# Patient Record
Sex: FEMALE | Race: WHITE | Employment: FULL TIME | URBAN - METROPOLITAN AREA
[De-identification: names, ages, dates, MRNs, and addresses within clinical notes are randomized per-mention and may not be internally consistent; named-entity substitution may affect disease eponyms.]

---

## 2019-06-14 ENCOUNTER — HOSPITAL ENCOUNTER (OUTPATIENT)
Dept: ULTRASOUND IMAGING | Age: 19
Discharge: HOME | End: 2019-06-14
Attending: NURSE PRACTITIONER

## 2019-06-14 DIAGNOSIS — R10.11 RUQ PAIN: ICD-10-CM

## 2019-06-20 NOTE — PROGRESS NOTES
Left two messages on patient's phone but she states she did not receive them. Normal ultrasound. Still having symptoms. Will refer to GI.   Brigid Colunga

## 2019-07-24 ENCOUNTER — HOSPITAL ENCOUNTER (OUTPATIENT)
Dept: NUCLEAR MEDICINE | Age: 19
Discharge: HOME | End: 2019-07-24
Attending: NURSE PRACTITIONER
Payer: COMMERCIAL

## 2019-07-24 DIAGNOSIS — R11.2 NAUSEA WITH VOMITING: ICD-10-CM

## 2019-07-24 DIAGNOSIS — R10.11 RIGHT UPPER QUADRANT PAIN: ICD-10-CM

## 2019-07-24 DIAGNOSIS — R10.13 EPIGASTRIC PAIN: ICD-10-CM

## 2019-07-24 PROCEDURE — 78227 HEPATOBIL SYST IMAGE W/DRUG: CPT

## 2019-07-24 PROCEDURE — 74011250636 HC RX REV CODE- 250/636: Performed by: NURSE PRACTITIONER

## 2019-07-24 RX ADMIN — SINCALIDE 1.24 MCG: 5 INJECTION, POWDER, LYOPHILIZED, FOR SOLUTION INTRAVENOUS at 10:40

## 2019-08-21 ENCOUNTER — HOSPITAL ENCOUNTER (OUTPATIENT)
Dept: LAB | Age: 19
Discharge: HOME | End: 2019-08-21

## 2019-08-21 PROCEDURE — 88312 SPECIAL STAINS GROUP 1: CPT

## 2019-08-21 PROCEDURE — 88305 TISSUE EXAM BY PATHOLOGIST: CPT

## 2022-07-07 ENCOUNTER — OFFICE VISIT (OUTPATIENT)
Dept: OCCUPATIONAL MEDICINE | Age: 22
End: 2022-07-07

## 2022-07-07 DIAGNOSIS — R11.0 NAUSEA: Primary | ICD-10-CM

## 2022-07-07 PROCEDURE — 99213 OFFICE O/P EST LOW 20 MIN: CPT | Performed by: NURSE PRACTITIONER

## 2022-07-07 RX ORDER — FLUOXETINE HYDROCHLORIDE 40 MG/1
CAPSULE ORAL
COMMUNITY
Start: 2022-05-07 | End: 2022-07-07 | Stop reason: ALTCHOICE

## 2022-07-07 RX ORDER — LEVONORGESTREL AND ETHINYL ESTRADIOL 0.1-0.02MG
1 KIT ORAL DAILY
COMMUNITY
Start: 2022-05-10

## 2022-07-07 RX ORDER — MEDROXYPROGESTERONE ACETATE 150 MG/ML
INJECTION, SUSPENSION INTRAMUSCULAR
COMMUNITY

## 2022-07-07 RX ORDER — ONDANSETRON 4 MG/1
4 TABLET, FILM COATED ORAL DAILY PRN
Qty: 30 TABLET | Refills: 0 | Status: SHIPPED | OUTPATIENT
Start: 2022-07-07 | End: 2022-08-17 | Stop reason: SDUPTHER

## 2022-07-12 ASSESSMENT — ENCOUNTER SYMPTOMS
SHORTNESS OF BREATH: 0
ABDOMINAL PAIN: 0

## 2022-07-12 NOTE — PROGRESS NOTES
PROGRESS NOTE    SUBJECTIVE:   Colleen Germain is a 24 y.o. female seen for a follow up visit regarding Nausea   Pt reports possibly food poisoning. Reports that she is feeling better today but still having a lot of nausea. And would like some PRN meds. Reports no other issues. Stable. No fever. No N/V/D. No cp, no sob. Past Medical History, Past Surgical History, Family history, Social History, and Medications were all reviewed with the patient today and updated as necessary. Current Outpatient Medications   Medication Sig Dispense Refill    levonorgestrel-ethinyl estradiol (AVIANE;ALESSE;LESSINA) 0.1-20 MG-MCG per tablet Take 1 tablet by mouth daily      ondansetron (ZOFRAN) 4 MG tablet Take 1 tablet by mouth daily as needed for Nausea or Vomiting 30 tablet 0    medroxyPROGESTERone (DEPO-PROVERA) 150 MG/ML injection Inject into the muscle       No current facility-administered medications for this visit. Not on File  There is no problem list on file for this patient. No past medical history on file. No past surgical history on file. Family History   Problem Relation Age of Onset    Heart Attack Paternal Grandfather 64    Liver Disease Maternal Grandmother         great grandmother liver cancer     Social History     Tobacco Use    Smoking status: Never Smoker    Smokeless tobacco: Never Used   Substance Use Topics    Alcohol use: No       Review of Systems   Eyes: Negative for visual disturbance. Respiratory: Negative for shortness of breath. Cardiovascular: Negative for chest pain, palpitations and leg swelling. Gastrointestinal: Negative for abdominal pain. Neurological: Negative for dizziness, syncope and light-headedness. OBJECTIVE:  There were no vitals taken for this visit. Physical Exam  Constitutional:       Appearance: Normal appearance. Cardiovascular:      Rate and Rhythm: Normal rate and regular rhythm.    Pulmonary:      Effort: Pulmonary effort is normal.      Breath sounds: Normal breath sounds. Neurological:      Mental Status: She is alert. ASSESSMENT and PLAN    Frank Pizarro was seen today for nausea. Diagnoses and all orders for this visit:    Nausea    Other orders  -     ondansetron (ZOFRAN) 4 MG tablet; Take 1 tablet by mouth daily as needed for Nausea or Vomiting        BRAT diet, sips of fluids, take zofran as needed. no improvement or worsening RTC>

## 2022-08-17 ENCOUNTER — OFFICE VISIT (OUTPATIENT)
Dept: OCCUPATIONAL MEDICINE | Age: 22
End: 2022-08-17

## 2022-08-17 DIAGNOSIS — F41.9 ANXIETY: Primary | ICD-10-CM

## 2022-08-17 PROCEDURE — 99213 OFFICE O/P EST LOW 20 MIN: CPT | Performed by: NURSE PRACTITIONER

## 2022-08-17 RX ORDER — ONDANSETRON 4 MG/1
4 TABLET, FILM COATED ORAL DAILY PRN
Qty: 30 TABLET | Refills: 1 | Status: SHIPPED | OUTPATIENT
Start: 2022-08-17

## 2022-08-17 RX ORDER — HYDROXYZINE PAMOATE 25 MG/1
25 CAPSULE ORAL 3 TIMES DAILY PRN
Qty: 30 CAPSULE | Refills: 0 | Status: SHIPPED | OUTPATIENT
Start: 2022-08-17 | End: 2022-08-31

## 2022-08-21 RX ORDER — FLUOXETINE HYDROCHLORIDE 40 MG/1
CAPSULE ORAL
COMMUNITY
Start: 2022-08-11

## 2022-08-21 ASSESSMENT — ENCOUNTER SYMPTOMS
SHORTNESS OF BREATH: 0
ABDOMINAL PAIN: 0

## 2022-08-21 NOTE — PROGRESS NOTES
PROGRESS NOTE    SUBJECTIVE:   Arben Worthy is a 24 y.o. female seen for a follow up visit regarding Anxiety   The patient is a 24 y.o. female who presents for follow up of anxiety. Current anxiety symptoms: palpitations, racing thoughts. Symptoms are gradually worsening since last visit. She denies current suicidal and homicidal ideation. She is  tolerating current treatment well and notes that it is  effective. Reports that she is having some breakthrough anxiety due to changes at home. Reporrts no SI/Hi. Reports that she did start taking her prozac again this week. Past Medical History, Past Surgical History, Family history, Social History, and Medications were all reviewed with the patient today and updated as necessary. Current Outpatient Medications   Medication Sig Dispense Refill    ondansetron (ZOFRAN) 4 MG tablet Take 1 tablet by mouth daily as needed for Nausea or Vomiting 30 tablet 1    hydrOXYzine pamoate (VISTARIL) 25 MG capsule Take 1 capsule by mouth 3 times daily as needed for Anxiety 30 capsule 0    FLUoxetine (PROZAC) 40 MG capsule TAKE ONE CAPSULE BY MOUTH ONE TIME DAILY      medroxyPROGESTERone (DEPO-PROVERA) 150 MG/ML injection Inject into the muscle      levonorgestrel-ethinyl estradiol (AVIANE;ALESSE;LESSINA) 0.1-20 MG-MCG per tablet Take 1 tablet by mouth daily       No current facility-administered medications for this visit. Not on File  There is no problem list on file for this patient. No past medical history on file. No past surgical history on file. Family History   Problem Relation Age of Onset    Heart Attack Paternal Grandfather 64    Liver Disease Maternal Grandmother         great grandmother liver cancer     Social History     Tobacco Use    Smoking status: Never    Smokeless tobacco: Never   Substance Use Topics    Alcohol use: No       Review of Systems   Eyes:  Negative for visual disturbance. Respiratory:  Negative for shortness of breath. Cardiovascular:  Negative for chest pain, palpitations and leg swelling. Gastrointestinal:  Negative for abdominal pain. Neurological:  Negative for dizziness, syncope and light-headedness. OBJECTIVE:  There were no vitals taken for this visit. Physical Exam  Cardiovascular:      Rate and Rhythm: Normal rate and regular rhythm. Pulmonary:      Effort: Pulmonary effort is normal.      Breath sounds: Normal breath sounds. Neurological:      Mental Status: She is alert. ASSESSMENT and PLAN    Zuhair Gilbert was seen today for anxiety. Diagnoses and all orders for this visit:    Anxiety    Other orders  -     ondansetron (ZOFRAN) 4 MG tablet; Take 1 tablet by mouth daily as needed for Nausea or Vomiting  -     hydrOXYzine pamoate (VISTARIL) 25 MG capsule; Take 1 capsule by mouth 3 times daily as needed for Anxiety      Will add on PRN vistaril. Continue with prozac. Reviewed nonpharm interventions. Reviewed triggers, prevention. No improvement or worsenign RTC. Follow up in 1 month.

## 2022-09-16 ENCOUNTER — TELEPHONE (OUTPATIENT)
Dept: OCCUPATIONAL MEDICINE | Age: 22
End: 2022-09-16

## 2022-09-16 RX ORDER — HYDROXYZINE PAMOATE 25 MG/1
25 CAPSULE ORAL 3 TIMES DAILY PRN
Qty: 30 CAPSULE | Refills: 1 | Status: SHIPPED | OUTPATIENT
Start: 2022-09-16 | End: 2022-10-06

## 2022-09-16 NOTE — TELEPHONE ENCOUNTER
Pt is doing well with PRN vistrail. Works well with no SE. Working on exercise and talking to someone. Will follow up in office thismonth.

## 2022-09-27 ENCOUNTER — OFFICE VISIT (OUTPATIENT)
Dept: OCCUPATIONAL MEDICINE | Age: 22
End: 2022-09-27

## 2022-09-27 DIAGNOSIS — G44.209 TENSION HEADACHE: Primary | ICD-10-CM

## 2022-09-27 RX ORDER — CYCLOBENZAPRINE HCL 5 MG
5 TABLET ORAL NIGHTLY PRN
Qty: 30 TABLET | Refills: 0 | Status: SHIPPED | OUTPATIENT
Start: 2022-09-27 | End: 2022-10-07

## 2022-09-27 RX ORDER — IBUPROFEN 800 MG/1
800 TABLET ORAL 2 TIMES DAILY PRN
Qty: 60 TABLET | Refills: 0 | Status: SHIPPED | OUTPATIENT
Start: 2022-09-27

## 2022-10-02 ASSESSMENT — ENCOUNTER SYMPTOMS
SHORTNESS OF BREATH: 0
ABDOMINAL PAIN: 0

## 2022-10-02 NOTE — PROGRESS NOTES
PROGRESS NOTE    SUBJECTIVE:   Fanny Wilkins is a 20 y.o. female seen for a follow up visit regarding Headache   Headache: Patient complains of headache. She does not have a headache at this time. Description of Headaches:  Location of pain: temporal  Radiation of pain?:bilateral  Character of pain:throbbing  Severity of pain: 5  Accompanying symptoms: nausea, vomiting  Prodromal sx?: nausea, vomiting  Rapidity of onset: sudden  Typical duration of individual headache: 4 hours  Are most headaches similar instress presentation? yes  Typical precipitants:     Temporal Pattern of Headaches:  Started having HAs several months ago  Worst time of day: morning, mid-day, afternoon, evening, nighttime  Awaken from sleep?: no  Seasonal pattern?: no  Clustering of HAs over time? no  Overall pattern since problem began: unchanged    Degree of Functional Impairment: moderate    Current Use of Meds to Treat HA:  Abortive meds? acetaminophen, aspirin/acetaminophen/caffeine  Daily use? yes   Prophylactic meds? NSAIDs     Additional Relevant History:  History of head/neck trauma? no  History of head/neck surgery? no  Family h/o headache problems? no  Use of meds that might worsen HAs? no  Exposure to carbon monoxide? no  Substance use:  no      Past Medical History, Past Surgical History, Family history, Social History, and Medications were all reviewed with the patient today and updated as necessary.        Current Outpatient Medications   Medication Sig Dispense Refill    cyclobenzaprine (FLEXERIL) 5 MG tablet Take 1 tablet by mouth nightly as needed for Muscle spasms 30 tablet 0    ibuprofen (ADVIL;MOTRIN) 800 MG tablet Take 1 tablet by mouth 2 times daily as needed for Pain 60 tablet 0    hydrOXYzine pamoate (VISTARIL) 25 MG capsule Take 1 capsule by mouth 3 times daily as needed for Anxiety 30 capsule 1    FLUoxetine (PROZAC) 40 MG capsule TAKE ONE CAPSULE BY MOUTH ONE TIME DAILY      ondansetron (ZOFRAN) 4 MG tablet Take 1 tablet by mouth daily as needed for Nausea or Vomiting 30 tablet 1    medroxyPROGESTERone (DEPO-PROVERA) 150 MG/ML injection Inject into the muscle      levonorgestrel-ethinyl estradiol (AVIANE;ALESSE;LESSINA) 0.1-20 MG-MCG per tablet Take 1 tablet by mouth daily       No current facility-administered medications for this visit. Not on File  There is no problem list on file for this patient. No past medical history on file. No past surgical history on file. Family History   Problem Relation Age of Onset    Heart Attack Paternal Grandfather 64    Liver Disease Maternal Grandmother         great grandmother liver cancer     Social History     Tobacco Use    Smoking status: Never    Smokeless tobacco: Never   Substance Use Topics    Alcohol use: No       Review of Systems   Eyes:  Negative for visual disturbance. Respiratory:  Negative for shortness of breath. Cardiovascular:  Negative for chest pain, palpitations and leg swelling. Gastrointestinal:  Negative for abdominal pain. Neurological:  Negative for dizziness, syncope and light-headedness. OBJECTIVE:  There were no vitals taken for this visit. Physical Exam  Constitutional:       Appearance: Normal appearance. Cardiovascular:      Rate and Rhythm: Normal rate and regular rhythm. Pulmonary:      Effort: Pulmonary effort is normal.      Breath sounds: Normal breath sounds. Neurological:      Mental Status: She is alert. ASSESSMENT and PLAN    Walt Woodall was seen today for headache. Diagnoses and all orders for this visit:    Tension headache    Other orders  -     cyclobenzaprine (FLEXERIL) 5 MG tablet; Take 1 tablet by mouth nightly as needed for Muscle spasms  -     ibuprofen (ADVIL;MOTRIN) 800 MG tablet; Take 1 tablet by mouth 2 times daily as needed for Pain      Increase hydration. Reviewed triggers, prevention and hygiene. Take meds as needed during headache. Keep headache diary.

## 2022-10-30 RX ORDER — HYDROXYZINE PAMOATE 25 MG/1
CAPSULE ORAL
Qty: 30 CAPSULE | Refills: 0 | OUTPATIENT
Start: 2022-10-30

## 2022-11-29 ENCOUNTER — OFFICE VISIT (OUTPATIENT)
Dept: OCCUPATIONAL MEDICINE | Age: 22
End: 2022-11-29

## 2022-11-29 DIAGNOSIS — F41.9 ANXIETY: Primary | ICD-10-CM

## 2022-11-29 RX ORDER — IBUPROFEN 800 MG/1
800 TABLET ORAL 2 TIMES DAILY PRN
Qty: 60 TABLET | Refills: 0 | Status: SHIPPED | OUTPATIENT
Start: 2022-11-29

## 2022-11-29 RX ORDER — HYDROXYZINE PAMOATE 25 MG/1
CAPSULE ORAL
Qty: 30 CAPSULE | Refills: 3 | Status: SHIPPED | OUTPATIENT
Start: 2022-11-29

## 2022-11-29 RX ORDER — HYDROXYZINE PAMOATE 25 MG/1
CAPSULE ORAL
COMMUNITY
Start: 2022-10-24 | End: 2022-11-29 | Stop reason: SDUPTHER

## 2022-11-30 LAB
CHOLEST SERPL-MCNC: 202 MG/DL (ref 100–199)
GLUCOSE SERPL-MCNC: 124 MG/DL (ref 70–99)
HBA1C MFR BLD: 5.3 % (ref 4.8–5.6)
HDLC SERPL-MCNC: 52 MG/DL
LDLC SERPL CALC-MCNC: 130 MG/DL (ref 0–99)
LDLC/HDLC SERPL: 2.5 RATIO (ref 0–3.2)
TRIGL SERPL-MCNC: 113 MG/DL (ref 0–149)
VLDLC SERPL CALC-MCNC: 20 MG/DL (ref 5–40)

## 2022-11-30 ASSESSMENT — ENCOUNTER SYMPTOMS
SHORTNESS OF BREATH: 0
ABDOMINAL PAIN: 0

## 2022-12-01 NOTE — PROGRESS NOTES
PROGRESS NOTE    SUBJECTIVE:   Manav Linda is a 20 y.o. female seen for a follow up visit regarding Follow-up     The patient is a 20 y.o. female who presents for follow up of anxiety. Current anxiety symptoms: none. Symptoms are controlled since last visit. She denies current suicidal and homicidal ideation. She is  tolerating current treatment well and notes that it is  effective. Past Medical History, Past Surgical History, Family history, Social History, and Medications were all reviewed with the patient today and updated as necessary. Current Outpatient Medications   Medication Sig Dispense Refill    ibuprofen (ADVIL;MOTRIN) 800 MG tablet Take 1 tablet by mouth 2 times daily as needed for Pain 60 tablet 0    hydrOXYzine pamoate (VISTARIL) 25 MG capsule TAKE ONE CAPSULE BY MOUTH THREE TIMES A DAY AS NEEDED FOR ANXIETY 30 capsule 3    FLUoxetine (PROZAC) 40 MG capsule TAKE ONE CAPSULE BY MOUTH ONE TIME DAILY      ondansetron (ZOFRAN) 4 MG tablet Take 1 tablet by mouth daily as needed for Nausea or Vomiting 30 tablet 1    medroxyPROGESTERone (DEPO-PROVERA) 150 MG/ML injection Inject into the muscle      levonorgestrel-ethinyl estradiol (AVIANE;ALESSE;LESSINA) 0.1-20 MG-MCG per tablet Take 1 tablet by mouth daily       No current facility-administered medications for this visit. Not on File  There is no problem list on file for this patient. No past medical history on file. No past surgical history on file. Family History   Problem Relation Age of Onset    Heart Attack Paternal Grandfather 64    Liver Disease Maternal Grandmother         great grandmother liver cancer     Social History     Tobacco Use    Smoking status: Never    Smokeless tobacco: Never   Substance Use Topics    Alcohol use: No       Review of Systems   Eyes:  Negative for visual disturbance. Respiratory:  Negative for shortness of breath.     Cardiovascular:  Negative for chest pain, palpitations and leg swelling. Gastrointestinal:  Negative for abdominal pain. Neurological:  Negative for dizziness, syncope and light-headedness. OBJECTIVE:  There were no vitals taken for this visit. Physical Exam  Constitutional:       Appearance: Normal appearance. Cardiovascular:      Rate and Rhythm: Normal rate and regular rhythm. Pulmonary:      Effort: Pulmonary effort is normal.      Breath sounds: Normal breath sounds. Neurological:      Mental Status: She is alert. ASSESSMENT and PLAN    Shante Mack was seen today for follow-up. Diagnoses and all orders for this visit:    Anxiety    Other orders  -     ibuprofen (ADVIL;MOTRIN) 800 MG tablet; Take 1 tablet by mouth 2 times daily as needed for Pain  -     hydrOXYzine pamoate (VISTARIL) 25 MG capsule; TAKE ONE CAPSULE BY MOUTH THREE TIMES A DAY AS NEEDED FOR ANXIETY  -     Lipid Panel with LDL/HDL Ratio  -     Hemoglobin A1C  -     Glucose, Random      Current treatment plan is effective, no change in therapy. Reviewed diet, exercise and weight control. RTC as needed. Reviewed nonpharm interventions.

## 2023-01-23 ENCOUNTER — OFFICE VISIT (OUTPATIENT)
Dept: FAMILY MEDICINE CLINIC | Facility: CLINIC | Age: 23
End: 2023-01-23
Payer: COMMERCIAL

## 2023-01-23 VITALS
BODY MASS INDEX: 31.89 KG/M2 | HEIGHT: 63 IN | DIASTOLIC BLOOD PRESSURE: 60 MMHG | WEIGHT: 180 LBS | RESPIRATION RATE: 16 BRPM | SYSTOLIC BLOOD PRESSURE: 110 MMHG | HEART RATE: 97 BPM | TEMPERATURE: 98 F | OXYGEN SATURATION: 99 %

## 2023-01-23 DIAGNOSIS — F90.2 ATTENTION DEFICIT HYPERACTIVITY DISORDER (ADHD), COMBINED TYPE: Primary | ICD-10-CM

## 2023-01-23 PROCEDURE — 99214 OFFICE O/P EST MOD 30 MIN: CPT | Performed by: NURSE PRACTITIONER

## 2023-01-23 RX ORDER — DEXTROAMPHETAMINE SACCHARATE, AMPHETAMINE ASPARTATE MONOHYDRATE, DEXTROAMPHETAMINE SULFATE AND AMPHETAMINE SULFATE 3.75; 3.75; 3.75; 3.75 MG/1; MG/1; MG/1; MG/1
15 CAPSULE, EXTENDED RELEASE ORAL EVERY MORNING
Qty: 30 CAPSULE | Refills: 0 | Status: SHIPPED | OUTPATIENT
Start: 2023-01-23 | End: 2023-02-22

## 2023-01-23 ASSESSMENT — ENCOUNTER SYMPTOMS
EYE DISCHARGE: 0
RHINORRHEA: 0
CONSTIPATION: 0
ABDOMINAL PAIN: 0
COUGH: 0
BLOOD IN STOOL: 0
SHORTNESS OF BREATH: 0
CHEST TIGHTNESS: 0
VOMITING: 0
DIARRHEA: 0
EYE PAIN: 0
WHEEZING: 0

## 2023-01-23 ASSESSMENT — PATIENT HEALTH QUESTIONNAIRE - PHQ9
2. FEELING DOWN, DEPRESSED OR HOPELESS: 0
1. LITTLE INTEREST OR PLEASURE IN DOING THINGS: 0
SUM OF ALL RESPONSES TO PHQ QUESTIONS 1-9: 0
SUM OF ALL RESPONSES TO PHQ QUESTIONS 1-9: 0
SUM OF ALL RESPONSES TO PHQ9 QUESTIONS 1 & 2: 0
SUM OF ALL RESPONSES TO PHQ QUESTIONS 1-9: 0
SUM OF ALL RESPONSES TO PHQ QUESTIONS 1-9: 0

## 2023-01-23 NOTE — PROGRESS NOTES
Ary Donohue (:  2000) is a 20 y.o. female,Established patient, here for evaluation of the following chief complaint(s):  ADD    Vyvanse was not on formulary so she was changed to Adderall XR 15 mg.       ASSESSMENT/PLAN:  1. Attention deficit hyperactivity disorder (ADHD), combined type  -     amphetamine-dextroamphetamine (ADDERALL XR) 15 MG extended release capsule; Take 1 capsule by mouth every morning for 30 days. Max Daily Amount: 15 mg, Disp-30 capsule, R-0Normal    Begin medications. UDS next office visit. Changed Vyvanse to Adderall. Encourage meditation and mindfulness as ways to control ADD. Patient was given the [employer] survey to complete. Return in about 4 weeks (around 2023) for medication refills, return survey. Subjective   SUBJECTIVE/OBJECTIVE:  Seen by APRN with the county who told her she probably had ADHD and would need to see her PCP for evaluation. Anxiety and depression is triggered by lack of focus and concentration. Was on meds as a child. Cannot finish tasks. Occurs daily. Mostly occurring at work. Has to make a list of things that needs to get finished. No problems with sleeping. No change in weight. Baby is now a toddler and is walking. Patient has had a recent divorce but she and her ex- has remained friends and both participate in caring for the baby. Remains active. Has good family support form her grandmother and her parents. ADD  This is a recurrent problem. The problem has been unchanged. Pertinent negatives include no abdominal pain, arthralgias, chest pain, congestion, coughing, fatigue, fever, headaches, myalgias, rash or vomiting. Associated symptoms comments: Lack of focus and concentration, cannot finish tasks, gets over whelmed easily,  has symptoms of OCD, constant anxiety from not accomplishing her tasks and procrastinating. Cristiano Emile      No Known Allergies  Current Outpatient Medications Medication Sig    amphetamine-dextroamphetamine (ADDERALL XR) 15 MG extended release capsule Take 1 capsule by mouth every morning for 30 days. Max Daily Amount: 15 mg    hydrOXYzine pamoate (VISTARIL) 25 MG capsule TAKE ONE CAPSULE BY MOUTH THREE TIMES A DAY AS NEEDED FOR ANXIETY    FLUoxetine (PROZAC) 40 MG capsule TAKE ONE CAPSULE BY MOUTH ONE TIME DAILY    levonorgestrel-ethinyl estradiol (AVIANE;ALESSE;LESSINA) 0.1-20 MG-MCG per tablet Take 1 tablet by mouth daily     No current facility-administered medications for this visit. There is no problem list on file for this patient. Review of Systems   Constitutional:  Negative for fatigue and fever. HENT:  Negative for congestion, hearing loss, postnasal drip and rhinorrhea. Eyes:  Negative for pain, discharge and visual disturbance. Respiratory:  Negative for cough, chest tightness, shortness of breath and wheezing. Cardiovascular:  Negative for chest pain, palpitations and leg swelling. Gastrointestinal:  Negative for abdominal pain, blood in stool, constipation, diarrhea and vomiting. Genitourinary:  Negative for difficulty urinating, frequency and urgency. Musculoskeletal:  Negative for arthralgias, gait problem and myalgias. Skin:  Negative for rash. Neurological:  Negative for dizziness, tremors, seizures and headaches. Psychiatric/Behavioral:  Positive for behavioral problems and decreased concentration. Negative for sleep disturbance and suicidal ideas. The patient is nervous/anxious and is hyperactive. Believes she has ADHD. Diagnosed as a child. Did well in school but is having a hard time now with concentrating and remain on task. /60   Pulse 97   Temp 98 °F (36.7 °C) (Tympanic)   Resp 16   Ht 5' 3\" (1.6 m)   Wt 180 lb (81.6 kg)   LMP 01/23/2023   SpO2 99%   BMI 31.89 kg/m²       Objective   Physical Exam  Constitutional:       Appearance: Normal appearance.    HENT:      Head: Normocephalic and atraumatic. Right Ear: Tympanic membrane, ear canal and external ear normal.      Left Ear: Tympanic membrane, ear canal and external ear normal.      Nose: Nose normal.      Mouth/Throat:      Mouth: Mucous membranes are moist.   Eyes:      Extraocular Movements: Extraocular movements intact. Conjunctiva/sclera: Conjunctivae normal.      Pupils: Pupils are equal, round, and reactive to light. Cardiovascular:      Rate and Rhythm: Normal rate and regular rhythm. Pulses: Normal pulses. Heart sounds: Normal heart sounds. Pulmonary:      Effort: Pulmonary effort is normal.      Breath sounds: Normal breath sounds. Abdominal:      General: Bowel sounds are normal.   Musculoskeletal:         General: Normal range of motion. Cervical back: Normal range of motion. Skin:     General: Skin is warm and dry. Neurological:      General: No focal deficit present. Mental Status: She is alert and oriented to person, place, and time. Psychiatric:         Attention and Perception: Attention and perception normal.         Mood and Affect: Mood is anxious. Speech: Speech normal.         Behavior: Behavior normal. Behavior is cooperative. Thought Content: Thought content normal.         Cognition and Memory: Cognition and memory normal.         Judgment: Judgment normal.        PHQ Scores 1/23/2023 8/4/2021 6/17/2021 5/25/2017   PHQ2 Score 0 - - -   PHQ2 Score - 0 0 0   PHQ9 Score 0 - - -     Interpretation of Total Score Depression Severity: 1-4 = Minimal depression, 5-9 = Mild depression, 10-14 = Moderate depression, 15-19 = Moderately severe depression, 20-27 = Severe depression    Body mass index is 31.89 kg/m². On this date 1/23/2023 I have spent 30 minutes reviewing previous notes, test results and face to face with the patient discussing the diagnosis and importance of compliance with the treatment plan as well as documenting on the day of the visit.       An electronic signature was used to authenticate this note.    --Dai Shah, APRN - CNP

## 2023-01-24 NOTE — PATIENT INSTRUCTIONS
Patient Education        A Healthy Lifestyle: Care Instructions  A healthy lifestyle can help you feel good, have more energy, and stay at a weight that's healthy for you. You can share a healthy lifestyle with your friends and family. And you can do it on your own. Eat meals with your friends or family. You could try cooking together. Plan activities with other people. Go for a walk with a friend, try a free online fitness class, or join a sports league. Eat a variety of healthy foods. These include fruits, vegetables, whole grains, low-fat dairy, and lean protein. Choose healthy portions of food. You can use the Nutrition Facts label on food packages as a guide. Eat more fruits and vegetables. You could add vegetables to sandwiches or add fruit to cereal.   Drink water when you are thirsty. Limit soda, juice, and sports drinks. Try to exercise most days. Aim for at least 2½ hours of exercise each week. Keep moving. Work in the garden or take your dog on a walk. Use the stairs instead of the elevator. If you use tobacco or nicotine, try to quit. Ask your doctor about programs and medicines to help you quit. Limit alcohol. Men should have no more than 2 drinks a day. Women should have no more than 1. For some people, no alcohol is the best choice. Follow-up care is a key part of your treatment and safety. Be sure to make and go to all appointments, and call your doctor if you are having problems. It's also a good idea to know your test results and keep a list of the medicines you take. Where can you learn more? Go to http://www.cho.com/ and enter U807 to learn more about \"A Healthy Lifestyle: Care Instructions. \"  Current as of: March 9, 2022               Content Version: 13.5  © 7086-4435 [employer], [employer]. Care instructions adapted under license by [facility] ([facility]).  If you have questions about a medical condition or this instruction, always ask your healthcare professional. [address] any warranty or liability for your use of this information.